# Patient Record
Sex: MALE | Race: WHITE | URBAN - METROPOLITAN AREA
[De-identification: names, ages, dates, MRNs, and addresses within clinical notes are randomized per-mention and may not be internally consistent; named-entity substitution may affect disease eponyms.]

---
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
## 2021-04-21 NOTE — TELEPHONE ENCOUNTER
FUTURE VISIT INFORMATION      FUTURE VISIT INFORMATION:    Date: 5/24/2021    Time: 10AM    Location: [facility]  REFERRAL INFORMATION:    Referring provider:  Nissen, Rick Lee, MD      Referring providers clinic:  [facility]      Reason for visit/diagnosis  Perforated ear drum- Referred by Dr. Nissen [facility]    RECORDS REQUESTED FROM:       Clinic name Comments Records Status Imaging Status   [facility]   4/7/2021 note from Nissen, Rick Lee, MD   Care everywhere    [facility]   03/17/2021 note from  Elizabeth Loving NP   Care everywhere    [facility]   3/15/2021 note from Ravin Bourne MD  Care everywhere    [facility]   [facility] MRN: [id number]   12/2/2018 CT Head  Sent to scan 4/21/21 req 4/21/21 - PACS                 4/21/2021 9:06AM sent a fax to [facility] for images - Amay   *images received in PACS - Amay

## 2021-05-10 DIAGNOSIS — Z01.10 ENCOUNTER FOR HEARING TEST: Primary | ICD-10-CM

## 2021-05-10 NOTE — PATIENT INSTRUCTIONS
1. You were seen in the ENT Clinic today by Dr. Miller.  If you have any questions or concerns after your appointment, please call   - Option 1: ENT Clinic: [phone]   - Option 2: Melissa (Dr. Miller's Nurse): [phone]    2.   Plan to return to clinic Surgery Teaching      1.You must have a physical exam (called  history and physical ) within 30 days of surgery. You can do this at the PAC clinic or your family clinic.     2.For same-day surgery, you must arrange for an adult to take you home from the Center. An adult must stay with you for the first 24 hours after surgery. You cannot drive for 24 hours.     3. Ask your doctor what medicines are safe before surgery.     4. Stop drinking alcohol at least 24 hours before surgery.     5. Stop or at least cut down on smoking 24 hours before surgery.    6.Take a bath or shower the night before and the morning of surgery (as told by your surgeon). Use an antiseptic soap. If your doctor does not give you special soap, buy Hibiclens or Shelia-Stat at the drug store or ask the pharmacist to suggest a brand.  Do not put on lotion, powder, perfume, deodorant or make-up after bathing.    7. You can eat a normal meal the night before surgery. Do not eat any solid foods or drink any milk products for 8 hours before surgery.     8. You may drink clear liquids until 2 hours before surgery. Clear liquids include water, Gatorade, apple juice and liquids you can read through.    9. NO MOTRIN, IBUPROFEN, ASPIRIN, ALEVE, GARLIC SUPPLEMENTS or FISH OIL x 7 days prior to surgery ( to prevent excess bleeding and bruising at time of surgery      Melissa Mejía LPN  [facility] - Otolaryngology

## 2021-05-24 ENCOUNTER — OFFICE VISIT (OUTPATIENT)
Dept: AUDIOLOGY | Facility: CLINIC | Age: 21
End: 2021-05-24
Payer: COMMERCIAL

## 2021-05-24 ENCOUNTER — PRE VISIT (OUTPATIENT)
Dept: OTOLARYNGOLOGY | Facility: CLINIC | Age: 21
End: 2021-05-24

## 2021-05-24 ENCOUNTER — OFFICE VISIT (OUTPATIENT)
Dept: OTOLARYNGOLOGY | Facility: CLINIC | Age: 21
End: 2021-05-24
Payer: COMMERCIAL

## 2021-05-24 VITALS
TEMPERATURE: 98.1 F | BODY MASS INDEX: 19.38 KG/M2 | DIASTOLIC BLOOD PRESSURE: 74 MMHG | WEIGHT: 127.87 LBS | HEART RATE: 59 BPM | RESPIRATION RATE: 14 BRPM | SYSTOLIC BLOOD PRESSURE: 118 MMHG | HEIGHT: 68 IN | OXYGEN SATURATION: 100 %

## 2021-05-24 DIAGNOSIS — H90.A12 CONDUCTIVE HEARING LOSS OF LEFT EAR WITH RESTRICTED HEARING OF RIGHT EAR: Primary | ICD-10-CM

## 2021-05-24 DIAGNOSIS — Z01.10 ENCOUNTER FOR HEARING TEST: ICD-10-CM

## 2021-05-24 DIAGNOSIS — H90.A21 SENSORINEURAL HEARING LOSS (SNHL) OF RIGHT EAR WITH RESTRICTED HEARING OF LEFT EAR: ICD-10-CM

## 2021-05-24 DIAGNOSIS — H72.92 PERFORATION OF LEFT TYMPANIC MEMBRANE: Primary | ICD-10-CM

## 2021-05-24 PROCEDURE — 99204 OFFICE O/P NEW MOD 45 MIN: CPT | Mod: 25 | Performed by: OTOLARYNGOLOGY

## 2021-05-24 PROCEDURE — 92550 TYMPANOMETRY & REFLEX THRESH: CPT | Mod: 52 | Performed by: AUDIOLOGIST-HEARING AID FITTER

## 2021-05-24 PROCEDURE — 92557 COMPREHENSIVE HEARING TEST: CPT | Performed by: AUDIOLOGIST-HEARING AID FITTER

## 2021-05-24 PROCEDURE — 92565 STENGER TEST PURE TONE: CPT | Performed by: AUDIOLOGIST-HEARING AID FITTER

## 2021-05-24 PROCEDURE — 92504 EAR MICROSCOPY EXAMINATION: CPT | Mod: GC | Performed by: OTOLARYNGOLOGY

## 2021-05-24 SDOH — HEALTH STABILITY: MENTAL HEALTH: HOW OFTEN DO YOU HAVE A DRINK CONTAINING ALCOHOL?: NOT ASKED

## 2021-05-24 SDOH — HEALTH STABILITY: MENTAL HEALTH: HOW OFTEN DO YOU HAVE 6 OR MORE DRINKS ON ONE OCCASION?: NOT ASKED

## 2021-05-24 SDOH — HEALTH STABILITY: MENTAL HEALTH: HOW MANY STANDARD DRINKS CONTAINING ALCOHOL DO YOU HAVE ON A TYPICAL DAY?: NOT ASKED

## 2021-05-24 ASSESSMENT — MIFFLIN-ST. JEOR: SCORE: 1564.5

## 2021-05-24 ASSESSMENT — PAIN SCALES - GENERAL: PAINLEVEL: NO PAIN (0)

## 2021-05-24 NOTE — NURSING NOTE
"Chief Complaint   Patient presents with     Consult     tympaic perforation      Blood pressure 118/74, pulse 59, temperature 98.1  F (36.7  C), resp. rate 14, height 1.727 m (5' 8\"), weight 58 kg (127 lb 13.9 oz), SpO2 100 %.    Ryan Petersen LPN    "

## 2021-05-24 NOTE — LETTER
5/24/2021       RE: Zach[place] Mcguire  [address] [address] [facility] [address]     Dear Colleague,    Thank you for referring your patient, Zachary Mcguire, to the [facility] [place] [facility]. Please see a copy of my visit note below.    Zachary Mcguire is seen in consultation from Dr. Rick Nissen.  He is a 20 year old male being seen for left tympanic membrane perforation. The patient presents for evaluation with his mother who provided supplementary historical details. The patient reports that he has experienced 7-8 episodes of acute otitis media per year over the past 3.5 years. These episodes are heralded by otalgia and aural fullness followed by otorrhea and typically occur after water exposure. These are typically treated with oral antibiotics. The patient reports a history of recurrent acute otitis media as a child requiring the placement of PE tubes. The PE tubes subsequently extruded, though he reports intermittent otorrhea from his left ear since age 5 years due to a persistent perforation. Today he denies changes in hearing, tinnitus, or vertigo. He reports barochallenge in left ear with changes in elevation.    Denies family history of hearing loss    Progress note from Dr. Rick Nissen was independently reviewed. From review of this note I learned that the patient had PE tubes placed at 18 months of age and has experienced intermittent drainage from the left ear since about 5 years of age. He reported that these episodes varied in frequency between 2-5 times per year. He denies otalgia.    No past medical history on file.    No past surgical history on file.    Family history of malignant hyperthermia. Denies family history of hearing loss.    Social History     Tobacco Use     Smoking status: Former Smoker     Packs/day: 0.50     Years: 2.00     Pack years: 1.00     Types: Cigarettes     Smokeless tobacco:  "Current User     Tobacco comment: chews tobacco  an average of twice per week   Substance Use Topics     Alcohol use: Not Currently     Drug use: None       Patient Supplied Answers to Review of Systems  [facility] ENT ROS 5/24/2021   Ears, Nose, Throat Ear pain   The remainder of the 10 point review of systems is otherwise negative.    Physical examination:  /74   Pulse 59   Temp 98.1  F (36.7  C)   Resp 14   Ht 1.727 m (5' 8\")   Wt 58 kg (127 lb 13.9 oz)   SpO2 100%   BMI 19.44 kg/m    Constitutional:  In no acute distress, appears stated age  Eyes:  Extraocular movements intact, no spontaneous nystagmus  Respiratory:  No increased work of breathing, wheezing or stridor  Musculoskeletal:  Good upper extremity strength  Skin:  No rashes on the head and neck  Neurologic:  House Brackman 1/6 bilaterally, ambulating normally  Psychiatric:  Alert, normal affect, answering questions appropriately  Ears:  Auricles are normal bilaterally.    Otomicroscopic Exam:  Right Ear: Ear canal lined with healthy skin. TM intact with tympanosclerosis in the posterior quadrant. Middle ear well-aerated.     Left Ear: Ear canal lined with healthy skin. Approximate 10% anteroinferior perforation, minimal mucosalization along the inferior aspect. Large plaque of tympanosclerosis of the posterior TM. Middle ear appears aerated.    Audiogram was independently reviewed and interpreted.    Audiogram: 5/24/2021     Right ear: Normal sloping to moderate SNHL.   Left ear: Mild upsloping to normal at 2kHz downsloping to mild CHL.   SRT right: 20 dB left: 30 dB   WR right: 100% at 60 dB left: 100% at 70 dB   Acoustic Reflexes: Right ipsi present. DNT all other conditions.  Tympanograms: type A (-40 daPa) right, type B, large canal volume left     Imaging: No imaging available for review.    IMPRESSION AND PLAN:  1.  Left tympanic membrane perforation  2.  Left conductive hearing loss, secondary to #1  3.  Right sensorineural hearing loss   "   We were able to show the patient the findings on the monitor today and discussed potential management options. In considering the benefit he may receive from left cartilage backed tympanoplasty, we must elucidate whether there is a component of eustachian tube dysfunction contributing to his episodes of otorrhea. The patient reports that his episodes of left otorrhea typically occur following water exposure, being most frequent in the spring and summer months. Further, there is no evidence of eustachian tube dysfunction in his right ear on today's otologic exam. This would support minimal to no eustachian tube dysfunction, and thus repair of his tympanic membrane perforation may be prudent to facilitate water activities as well as to eliminate future episodes of otorrhea. We engaged in a discussion regarding the possibility of the presence of eustachian tube dysfunction, and the fact that repair of his tympanic membrane perforation may thus necessitate placement of a PE tube in the future. The patient and his mother expressed understanding of this fact and are motivated to have a treatment performed on the left ear.     The procedure recommended is a left cartilage backed tympanoplasty. We will likely be able to complete this procedure transcanal. The risks and benefits were discussed.  The risks include but are not limited to:  Worsened hearing which may require further surgery, profound and irreversible hearing loss, dizziness, damage to the taste nerve, damage to the facial nerve, tympanic membrane perforation requiring further surgery and infection.  Postoperative restrictions were discussed. He has a family history of malignant hyperthermia and we thus discussed the possibility of a procedure under monitored anesthesia care. The patient and his mother will consider this option further.     With deliberation, Zachary Mcguire indicated that he would like to proceed. We will ask him to see our preoperative  anesthesia center for his preoperative assessment to discuss his risk of malignant hyperthermia.     Thank you very much for the opportunity to participate in the care of your patient.    Naresh Mancilla MD  Neurotology Fellow  Department of Otolaryngology - Head and Neck Surgery  [facility]    I, Ivanna Miller MD, saw this patient with the resident/fellow and agree with the resident/fellow s findings and plan of care as documented in the resident s/fellow s note. I was present for the entire procedure.    Ivanna Miller MD          Again, thank you for allowing me to participate in the care of your patient.      Sincerely,    Ivanna Miller MD

## 2021-05-24 NOTE — PROGRESS NOTES
Zachary Mcguire is seen in consultation from Dr. Rick Nissen.  He is a 20 year old male being seen for left tympanic membrane perforation. The patient presents for evaluation with his mother who provided supplementary historical details. The patient reports that he has experienced 7-8 episodes of acute otitis media per year over the past 3.5 years. These episodes are heralded by otalgia and aural fullness followed by otorrhea and typically occur after water exposure. These are typically treated with oral antibiotics. The patient reports a history of recurrent acute otitis media as a child requiring the placement of PE tubes. The PE tubes subsequently extruded, though he reports intermittent otorrhea from his left ear since age 5 years due to a persistent perforation. Today he denies changes in hearing, tinnitus, or vertigo. He reports barochallenge in left ear with changes in elevation.    Denies family history of hearing loss    Progress note from Dr. Rick Nissen was independently reviewed. From review of this note I learned that the patient had PE tubes placed at 18 months of age and has experienced intermittent drainage from the left ear since about 5 years of age. He reported that these episodes varied in frequency between 2-5 times per year. He denies otalgia.    No past medical history on file.    No past surgical history on file.    Family history of malignant hyperthermia. Denies family history of hearing loss.    Social History     Tobacco Use     Smoking status: Former Smoker     Packs/day: 0.50     Years: 2.00     Pack years: 1.00     Types: Cigarettes     Smokeless tobacco: Current User     Tobacco comment: chews tobacco  an average of twice per week   Substance Use Topics     Alcohol use: Not Currently     Drug use: None       Patient Supplied Answers to Review of Systems   ENT ROS 5/24/2021   Ears, Nose, Throat Ear pain   The remainder of the 10 point review of systems is otherwise  "negative.    Physical examination:  /74   Pulse 59   Temp 98.1  F (36.7  C)   Resp 14   Ht 1.727 m (5' 8\")   Wt 58 kg (127 lb 13.9 oz)   SpO2 100%   BMI 19.44 kg/m    Constitutional:  In no acute distress, appears stated age  Eyes:  Extraocular movements intact, no spontaneous nystagmus  Respiratory:  No increased work of breathing, wheezing or stridor  Musculoskeletal:  Good upper extremity strength  Skin:  No rashes on the head and neck  Neurologic:  House Brackman 1/6 bilaterally, ambulating normally  Psychiatric:  Alert, normal affect, answering questions appropriately  Ears:  Auricles are normal bilaterally.    Otomicroscopic Exam:  Right Ear: Ear canal lined with healthy skin. TM intact with tympanosclerosis in the posterior quadrant. Middle ear well-aerated.     Left Ear: Ear canal lined with healthy skin. Approximate 10% anteroinferior perforation, minimal mucosalization along the inferior aspect. Large plaque of tympanosclerosis of the posterior TM. Middle ear appears aerated.    Audiogram was independently reviewed and interpreted.    Audiogram: 5/24/2021     Right ear: Normal sloping to moderate SNHL.   Left ear: Mild upsloping to normal at 2kHz downsloping to mild CHL.   SRT right: 20 dB left: 30 dB   WR right: 100% at 60 dB left: 100% at 70 dB   Acoustic Reflexes: Right ipsi present. DNT all other conditions.  Tympanograms: type A (-40 daPa) right, type B, large canal volume left     Imaging: No imaging available for review.    IMPRESSION AND PLAN:  1.  Left tympanic membrane perforation  2.  Left conductive hearing loss, secondary to #1  3.  Right sensorineural hearing loss     We were able to show the patient the findings on the monitor today and discussed potential management options. In considering the benefit he may receive from left cartilage backed tympanoplasty, we must elucidate whether there is a component of eustachian tube dysfunction contributing to his episodes of otorrhea. " The patient reports that his episodes of left otorrhea typically occur following water exposure, being most frequent in the spring and summer months. Further, there is no evidence of eustachian tube dysfunction in his right ear on today's otologic exam. This would support minimal to no eustachian tube dysfunction, and thus repair of his tympanic membrane perforation may be prudent to facilitate water activities as well as to eliminate future episodes of otorrhea. We engaged in a discussion regarding the possibility of the presence of eustachian tube dysfunction, and the fact that repair of his tympanic membrane perforation may thus necessitate placement of a PE tube in the future. The patient and his mother expressed understanding of this fact and are motivated to have a treatment performed on the left ear.     The procedure recommended is a left cartilage backed tympanoplasty. We will likely be able to complete this procedure transcanal. The risks and benefits were discussed.  The risks include but are not limited to:  Worsened hearing which may require further surgery, profound and irreversible hearing loss, dizziness, damage to the taste nerve, damage to the facial nerve, tympanic membrane perforation requiring further surgery and infection.  Postoperative restrictions were discussed. He has a family history of malignant hyperthermia and we thus discussed the possibility of a procedure under monitored anesthesia care. The patient and his mother will consider this option further.     With deliberation, Zachary Mcguire indicated that he would like to proceed. We will ask him to see our preoperative anesthesia center for his preoperative assessment to discuss his risk of malignant hyperthermia.     Thank you very much for the opportunity to participate in the care of your patient.    Naresh Mancilla MD  Neurotology Fellow  Department of Otolaryngology - Head and Neck Surgery  [facility]    Ivanna GONZALEZ  BRIAN Miller MD, saw this patient with the resident/fellow and agree with the resident/fellow s findings and plan of care as documented in the resident s/fellow s note. I was present for the entire procedure.    Ivanna Miller MD

## 2021-05-24 NOTE — PROGRESS NOTES
AUDIOLOGY REPORT    SUMMARY: Audiology visit completed. See audiogram for results.      RECOMMENDATIONS: Follow-up with ENT.    JAYDEN Perez.   Audiology Doctoral Extern  License #[id number]    I was present with the patient for the entire Audiology appointment including all procedures/testing performed by the AuD student, and agree with the student s assessment and plan as documented.    Jaciel William, CCC-A, [facility]  Licensed Audiologist  [place] #[id number]

## 2021-06-10 RX ORDER — CLINDAMYCIN PHOSPHATE 900 MG/50ML
900 INJECTION, SOLUTION INTRAVENOUS
Status: CANCELLED | OUTPATIENT
Start: 2021-06-10

## 2021-06-10 RX ORDER — CLINDAMYCIN PHOSPHATE 900 MG/50ML
900 INJECTION, SOLUTION INTRAVENOUS SEE ADMIN INSTRUCTIONS
Status: CANCELLED | OUTPATIENT
Start: 2021-06-10

## 2021-06-10 RX ORDER — DEXAMETHASONE SODIUM PHOSPHATE 4 MG/ML
10 INJECTION, SOLUTION INTRA-ARTICULAR; INTRALESIONAL; INTRAMUSCULAR; INTRAVENOUS; SOFT TISSUE ONCE
Status: CANCELLED | OUTPATIENT
Start: 2021-06-10 | End: 2021-06-10

## 2021-06-15 ENCOUNTER — TELEPHONE (OUTPATIENT)
Dept: OTOLARYNGOLOGY | Facility: CLINIC | Age: 21
End: 2021-06-15

## 2021-06-15 NOTE — TELEPHONE ENCOUNTER
Attempted to contact patient to schedule surgery. Patients voicemail box is full and writer is unable to leave a message.       Julia Hazel on 6/15/2021 at 3:03 PM

## 2021-06-15 NOTE — TELEPHONE ENCOUNTER
Left message regarding scheduling surgery with Dr. Miller.   Writer left call back number on the patients voicemail       Julia Hazel on 6/15/2021 at 3:22 PM   P: [phone]

## 2021-06-18 DIAGNOSIS — Z11.59 ENCOUNTER FOR SCREENING FOR OTHER VIRAL DISEASES: ICD-10-CM

## 2021-06-18 PROBLEM — H72.92 PERFORATION OF LEFT TYMPANIC MEMBRANE: Status: ACTIVE | Noted: 2021-06-18

## 2021-06-21 NOTE — TELEPHONE ENCOUNTER
Patient called back to schedule surgery with Dr. Miller    Date of Surgery: 07/12/2021  Location of surgery: [facility]  Pre-Op H&P: PCP   Post-Op Appt Date: 3 weeks   Imaging needed:  No  Discussed COVID-19 testing:  Yes  Pre-cert/Authorization completed:  No  Packet sent out: Yes 06/21/21

## 2021-07-07 ENCOUNTER — TELEPHONE (OUTPATIENT)
Dept: OTOLARYNGOLOGY | Facility: CLINIC | Age: 21
End: 2021-07-07

## 2021-07-07 ENCOUNTER — ANESTHESIA EVENT (OUTPATIENT)
Dept: SURGERY | Facility: AMBULATORY SURGERY CENTER | Age: 21
End: 2021-07-07
Payer: COMMERCIAL

## 2021-07-07 DIAGNOSIS — H72.92 PERFORATION OF LEFT TYMPANIC MEMBRANE: Primary | ICD-10-CM

## 2021-07-07 NOTE — TELEPHONE ENCOUNTER
Tried calling this patient to schedule a [facility] Return appt with Dr. Miller. OK to schedule with another return patient. (Ideally- 2:45pm on 8/23)    Appt Note: 6-7 week post op, surgery 7/12, WIN prior      They will also need a (WIN) hearing test with any of the Audiologists, completed prior to this appt. (Ideally- 2:00pm on 8/23)    Appt Note: WIN

## 2021-07-12 ENCOUNTER — HOSPITAL ENCOUNTER (OUTPATIENT)
Facility: AMBULATORY SURGERY CENTER | Age: 21
End: 2021-07-12
Attending: OTOLARYNGOLOGY
Payer: COMMERCIAL

## 2021-07-12 ENCOUNTER — ANESTHESIA (OUTPATIENT)
Dept: SURGERY | Facility: AMBULATORY SURGERY CENTER | Age: 21
End: 2021-07-12
Payer: COMMERCIAL

## 2021-07-12 VITALS
DIASTOLIC BLOOD PRESSURE: 64 MMHG | RESPIRATION RATE: 16 BRPM | OXYGEN SATURATION: 98 % | WEIGHT: 125 LBS | TEMPERATURE: 97.8 F | BODY MASS INDEX: 18.94 KG/M2 | HEART RATE: 75 BPM | SYSTOLIC BLOOD PRESSURE: 106 MMHG | HEIGHT: 68 IN

## 2021-07-12 DIAGNOSIS — H72.92 PERFORATION OF LEFT TYMPANIC MEMBRANE: ICD-10-CM

## 2021-07-12 DIAGNOSIS — G89.18 POSTOPERATIVE PAIN: Primary | ICD-10-CM

## 2021-07-12 PROCEDURE — 69620 MYRINGOPLASTY: CPT | Mod: LT

## 2021-07-12 RX ORDER — DEXAMETHASONE SODIUM PHOSPHATE 4 MG/ML
INJECTION, SOLUTION INTRA-ARTICULAR; INTRALESIONAL; INTRAMUSCULAR; INTRAVENOUS; SOFT TISSUE PRN
Status: DISCONTINUED | OUTPATIENT
Start: 2021-07-12 | End: 2021-07-12

## 2021-07-12 RX ORDER — LIDOCAINE HYDROCHLORIDE 10 MG/ML
INJECTION, SOLUTION INFILTRATION; PERINEURAL PRN
Status: DISCONTINUED | OUTPATIENT
Start: 2021-07-12 | End: 2021-07-12

## 2021-07-12 RX ORDER — ONDANSETRON 2 MG/ML
4 INJECTION INTRAMUSCULAR; INTRAVENOUS EVERY 30 MIN PRN
Status: DISCONTINUED | OUTPATIENT
Start: 2021-07-12 | End: 2021-07-13 | Stop reason: HOSPADM

## 2021-07-12 RX ORDER — HYDROMORPHONE HYDROCHLORIDE 1 MG/ML
.3-.5 INJECTION, SOLUTION INTRAMUSCULAR; INTRAVENOUS; SUBCUTANEOUS EVERY 10 MIN PRN
Status: DISCONTINUED | OUTPATIENT
Start: 2021-07-12 | End: 2021-07-13 | Stop reason: HOSPADM

## 2021-07-12 RX ORDER — OFLOXACIN 3 MG/ML
5 SOLUTION AURICULAR (OTIC) 2 TIMES DAILY
Qty: 10 ML | Refills: 0 | Status: SHIPPED | OUTPATIENT
Start: 2021-07-19 | End: 2021-08-09

## 2021-07-12 RX ORDER — LIDOCAINE 40 MG/G
CREAM TOPICAL
Status: DISCONTINUED | OUTPATIENT
Start: 2021-07-12 | End: 2021-07-12 | Stop reason: HOSPADM

## 2021-07-12 RX ORDER — LORAZEPAM 2 MG/ML
.5-1 INJECTION INTRAMUSCULAR
Status: DISCONTINUED | OUTPATIENT
Start: 2021-07-12 | End: 2021-07-12 | Stop reason: HOSPADM

## 2021-07-12 RX ORDER — CLINDAMYCIN PHOSPHATE 900 MG/50ML
900 INJECTION, SOLUTION INTRAVENOUS
Status: COMPLETED | OUTPATIENT
Start: 2021-07-12 | End: 2021-07-12

## 2021-07-12 RX ORDER — ONDANSETRON 4 MG/1
4 TABLET, ORALLY DISINTEGRATING ORAL EVERY 30 MIN PRN
Status: DISCONTINUED | OUTPATIENT
Start: 2021-07-12 | End: 2021-07-13 | Stop reason: HOSPADM

## 2021-07-12 RX ORDER — NALOXONE HYDROCHLORIDE 0.4 MG/ML
0.2 INJECTION, SOLUTION INTRAMUSCULAR; INTRAVENOUS; SUBCUTANEOUS
Status: DISCONTINUED | OUTPATIENT
Start: 2021-07-12 | End: 2021-07-13 | Stop reason: HOSPADM

## 2021-07-12 RX ORDER — CLINDAMYCIN PHOSPHATE 900 MG/50ML
900 INJECTION, SOLUTION INTRAVENOUS SEE ADMIN INSTRUCTIONS
Status: DISCONTINUED | OUTPATIENT
Start: 2021-07-12 | End: 2021-07-12 | Stop reason: HOSPADM

## 2021-07-12 RX ORDER — SODIUM CHLORIDE, SODIUM LACTATE, POTASSIUM CHLORIDE, CALCIUM CHLORIDE 600; 310; 30; 20 MG/100ML; MG/100ML; MG/100ML; MG/100ML
INJECTION, SOLUTION INTRAVENOUS CONTINUOUS
Status: DISCONTINUED | OUTPATIENT
Start: 2021-07-12 | End: 2021-07-12 | Stop reason: HOSPADM

## 2021-07-12 RX ORDER — MEPERIDINE HYDROCHLORIDE 25 MG/ML
12.5 INJECTION INTRAMUSCULAR; INTRAVENOUS; SUBCUTANEOUS
Status: DISCONTINUED | OUTPATIENT
Start: 2021-07-12 | End: 2021-07-13 | Stop reason: HOSPADM

## 2021-07-12 RX ORDER — GLYCOPYRROLATE 0.2 MG/ML
INJECTION, SOLUTION INTRAMUSCULAR; INTRAVENOUS PRN
Status: DISCONTINUED | OUTPATIENT
Start: 2021-07-12 | End: 2021-07-12

## 2021-07-12 RX ORDER — KETOROLAC TROMETHAMINE 30 MG/ML
30 INJECTION, SOLUTION INTRAMUSCULAR; INTRAVENOUS EVERY 6 HOURS PRN
Status: DISCONTINUED | OUTPATIENT
Start: 2021-07-12 | End: 2021-07-13 | Stop reason: HOSPADM

## 2021-07-12 RX ORDER — NALOXONE HYDROCHLORIDE 0.4 MG/ML
0.4 INJECTION, SOLUTION INTRAMUSCULAR; INTRAVENOUS; SUBCUTANEOUS
Status: DISCONTINUED | OUTPATIENT
Start: 2021-07-12 | End: 2021-07-13 | Stop reason: HOSPADM

## 2021-07-12 RX ORDER — ONDANSETRON 2 MG/ML
INJECTION INTRAMUSCULAR; INTRAVENOUS PRN
Status: DISCONTINUED | OUTPATIENT
Start: 2021-07-12 | End: 2021-07-12

## 2021-07-12 RX ORDER — HYDROCODONE BITARTRATE AND ACETAMINOPHEN 5; 325 MG/1; MG/1
1 TABLET ORAL
Status: DISCONTINUED | OUTPATIENT
Start: 2021-07-12 | End: 2021-07-13 | Stop reason: HOSPADM

## 2021-07-12 RX ORDER — ACETAMINOPHEN 325 MG/1
975 TABLET ORAL ONCE
Status: COMPLETED | OUTPATIENT
Start: 2021-07-12 | End: 2021-07-12

## 2021-07-12 RX ORDER — LIDOCAINE HYDROCHLORIDE AND EPINEPHRINE 10; 10 MG/ML; UG/ML
INJECTION, SOLUTION INFILTRATION; PERINEURAL PRN
Status: DISCONTINUED | OUTPATIENT
Start: 2021-07-12 | End: 2021-07-12 | Stop reason: HOSPADM

## 2021-07-12 RX ORDER — DEXAMETHASONE SODIUM PHOSPHATE 10 MG/ML
10 INJECTION, SOLUTION INTRAMUSCULAR; INTRAVENOUS ONCE
Status: DISCONTINUED | OUTPATIENT
Start: 2021-07-12 | End: 2021-07-12 | Stop reason: HOSPADM

## 2021-07-12 RX ORDER — ACETAMINOPHEN 325 MG/1
650 TABLET ORAL
Status: DISCONTINUED | OUTPATIENT
Start: 2021-07-12 | End: 2021-07-13 | Stop reason: HOSPADM

## 2021-07-12 RX ORDER — SODIUM CHLORIDE, SODIUM LACTATE, POTASSIUM CHLORIDE, CALCIUM CHLORIDE 600; 310; 30; 20 MG/100ML; MG/100ML; MG/100ML; MG/100ML
INJECTION, SOLUTION INTRAVENOUS CONTINUOUS
Status: DISCONTINUED | OUTPATIENT
Start: 2021-07-12 | End: 2021-07-13 | Stop reason: HOSPADM

## 2021-07-12 RX ORDER — ALBUTEROL SULFATE 0.83 MG/ML
2.5 SOLUTION RESPIRATORY (INHALATION) EVERY 4 HOURS PRN
Status: DISCONTINUED | OUTPATIENT
Start: 2021-07-12 | End: 2021-07-12 | Stop reason: HOSPADM

## 2021-07-12 RX ORDER — FENTANYL CITRATE 50 UG/ML
25-50 INJECTION, SOLUTION INTRAMUSCULAR; INTRAVENOUS
Status: DISCONTINUED | OUTPATIENT
Start: 2021-07-12 | End: 2021-07-13 | Stop reason: HOSPADM

## 2021-07-12 RX ORDER — OXYCODONE HYDROCHLORIDE 5 MG/1
5 TABLET ORAL EVERY 4 HOURS PRN
Status: DISCONTINUED | OUTPATIENT
Start: 2021-07-12 | End: 2021-07-13 | Stop reason: HOSPADM

## 2021-07-12 RX ORDER — HYDROCODONE BITARTRATE AND ACETAMINOPHEN 5; 325 MG/1; MG/1
1 TABLET ORAL EVERY 6 HOURS PRN
Qty: 4 TABLET | Refills: 0 | Status: SHIPPED | OUTPATIENT
Start: 2021-07-12 | End: 2021-08-23

## 2021-07-12 RX ORDER — PROPOFOL 10 MG/ML
INJECTION, EMULSION INTRAVENOUS PRN
Status: DISCONTINUED | OUTPATIENT
Start: 2021-07-12 | End: 2021-07-12

## 2021-07-12 RX ORDER — DIMENHYDRINATE 50 MG/ML
25 INJECTION, SOLUTION INTRAMUSCULAR; INTRAVENOUS
Status: DISCONTINUED | OUTPATIENT
Start: 2021-07-12 | End: 2021-07-13 | Stop reason: HOSPADM

## 2021-07-12 RX ORDER — FENTANYL CITRATE 50 UG/ML
25-50 INJECTION, SOLUTION INTRAMUSCULAR; INTRAVENOUS
Status: DISCONTINUED | OUTPATIENT
Start: 2021-07-12 | End: 2021-07-12 | Stop reason: HOSPADM

## 2021-07-12 RX ORDER — KETAMINE HYDROCHLORIDE 10 MG/ML
INJECTION INTRAMUSCULAR; INTRAVENOUS PRN
Status: DISCONTINUED | OUTPATIENT
Start: 2021-07-12 | End: 2021-07-12

## 2021-07-12 RX ORDER — PROPOFOL 10 MG/ML
INJECTION, EMULSION INTRAVENOUS CONTINUOUS PRN
Status: DISCONTINUED | OUTPATIENT
Start: 2021-07-12 | End: 2021-07-12

## 2021-07-12 RX ORDER — GABAPENTIN 300 MG/1
300 CAPSULE ORAL ONCE
Status: COMPLETED | OUTPATIENT
Start: 2021-07-12 | End: 2021-07-12

## 2021-07-12 RX ADMIN — SODIUM CHLORIDE, SODIUM LACTATE, POTASSIUM CHLORIDE, CALCIUM CHLORIDE: 600; 310; 30; 20 INJECTION, SOLUTION INTRAVENOUS at 08:58

## 2021-07-12 RX ADMIN — PROPOFOL 125 MCG/KG/MIN: 10 INJECTION, EMULSION INTRAVENOUS at 09:30

## 2021-07-12 RX ADMIN — PROPOFOL 30 MG: 10 INJECTION, EMULSION INTRAVENOUS at 09:27

## 2021-07-12 RX ADMIN — CLINDAMYCIN PHOSPHATE 900 MG: 900 INJECTION, SOLUTION INTRAVENOUS at 09:00

## 2021-07-12 RX ADMIN — ACETAMINOPHEN 975 MG: 325 TABLET ORAL at 08:56

## 2021-07-12 RX ADMIN — LIDOCAINE HYDROCHLORIDE 60 MG: 10 INJECTION, SOLUTION INFILTRATION; PERINEURAL at 09:26

## 2021-07-12 RX ADMIN — PROPOFOL 20 MG: 10 INJECTION, EMULSION INTRAVENOUS at 09:30

## 2021-07-12 RX ADMIN — KETAMINE HYDROCHLORIDE 20 MG: 10 INJECTION INTRAMUSCULAR; INTRAVENOUS at 09:30

## 2021-07-12 RX ADMIN — ONDANSETRON 4 MG: 2 INJECTION INTRAMUSCULAR; INTRAVENOUS at 09:26

## 2021-07-12 RX ADMIN — GLYCOPYRROLATE 100 MCG: 0.2 INJECTION, SOLUTION INTRAMUSCULAR; INTRAVENOUS at 09:25

## 2021-07-12 RX ADMIN — DEXAMETHASONE SODIUM PHOSPHATE 10 MG: 4 INJECTION, SOLUTION INTRA-ARTICULAR; INTRALESIONAL; INTRAMUSCULAR; INTRAVENOUS; SOFT TISSUE at 09:30

## 2021-07-12 RX ADMIN — GABAPENTIN 300 MG: 300 CAPSULE ORAL at 08:55

## 2021-07-12 ASSESSMENT — LIFESTYLE VARIABLES: TOBACCO_USE: 1

## 2021-07-12 ASSESSMENT — MIFFLIN-ST. JEOR: SCORE: 1546.5

## 2021-07-12 NOTE — ANESTHESIA CARE TRANSFER NOTE
Patient: Zachary Mcguire    Procedure(s):  TYMPANOPLASTY WITH CARTILAGE BACKING    Diagnosis: Perforation of left tympanic membrane [H72.92]  Diagnosis Additional Information: No value filed.    Anesthesia Type:   No value filed.     Note:    Oropharynx: oropharynx clear of all foreign objects and spontaneously breathing  Level of Consciousness: drowsy  Oxygen Supplementation: room air    Independent Airway: airway patency satisfactory and stable  Dentition: dentition unchanged      Patient transferred to: Phase II    Handoff Report: Identifed the Patient, Identified the Reponsible Provider, Reviewed the pertinent medical history, Discussed the surgical course, Reviewed Intra-OP anesthesia mangement and issues during anesthesia, Set expectations for post-procedure period and Allowed opportunity for questions and acknowledgement of understanding      Vitals: (Last set prior to Anesthesia Care Transfer)  CRNA VITALS  7/12/2021 0937 - 7/12/2021 1023      7/12/2021             Pulse:  90    SpO2:  97 %    Resp Rate (set):  10        Electronically Signed By: MARCELINA De La Cruz CRNA  July 12, 2021  10:23 AM

## 2021-07-12 NOTE — BRIEF OP NOTE
[facility]    Brief Operative Note    Pre-operative diagnosis: Perforation of left tympanic membrane [H72.92]  Post-operative diagnosis Same as pre-operative diagnosis    Procedure: Procedure(s):  TYMPANOPLASTY WITH CARTILAGE BACKING  Surgeon: Surgeon(s) and Role:     * Ivanna Miller MD - Primary     * Zaire Tellez MD - Resident - Assisting  Anesthesia: Combined MAC with Local   Estimated blood loss: None  Drains: None  Specimens: * No specimens in log *  Findings: left microperforation in the anteroinferior quadrant, filled with tragal cartilage graft   Complications: None   .  Implants: * No implants in log *

## 2021-07-12 NOTE — OP NOTE
Date of service:  7/12/21    Preoperative diagnosis:  Tympanic membrane perforation    Postoperative diagnosis:  Tympanic membrane perforation    Procedure:  1.  Left myringoplasty with cartilage backing  2.  Facial nerve monitoring x 0.5 hours    Surgeon:  Ivanna Miller MD    Fellow:  Marian Palafox MD    Resident:  Zaire Tellez MD    Anesthesia:  MAC plus local    EBL:  2cc    Specimens:  None    Complications:  None    Findings: 15% anterior inferior perforation after rimming, middle ear appeared clear through the perforation, no retractions.    Indications:  Zachary Mcguire is a patient with a left tympanic membrane perforation.  Discussion was had about tympanoplasty.  Risks and benefits had been discussed and consent had been obtained.    Procedure:  The patient was taken to the operating room and placed supine on the operating room table.  The patient was placed under MAC.  Time Out was then performed with confirmation of the patient, site and procedure. 1% lidocaine with 1:100,000 epinephrine was injected into the ear canal and tragus.  The area was prepped and draped in standard surgical fashion. Tragal incision was made and an approximately 2mm x 2mm piece of cartilage with perichondrium was harvested. The incision was closed with chromic suture. The operating microscope was brought into position and used for the remainder of the case.  The ear canal was inspected and irrigated with normal saline.  The tympanic membrane was inspected.  There is noted to be a 10% anterior inferior round perforation with clean edges and slight amount of mucosalization on the superior edge of the perforation. The perforation was rimmed. The middle ear was clear through the perforation. There were no retractions noted. Gelfoam was placed into the middle ear through the perforation. The cartilage graft was placed with the perichondrium up through the perforation to close the perforation. Gelfoam used to cover the graft.   Bacitracin was used to fill the ear canal.  A cotton ball was placed over the ear canal and the facial nerve electrodes removed.  The patient tolerated the procedure well and counts were correct.  The patient was awakened and taken to the PACU in stable condition.    Ivanna GONZALEZ MD, was scrubbed during all portions of the procedure.

## 2021-07-12 NOTE — ANESTHESIA POSTPROCEDURE EVALUATION
Patient: Zachary Mcguire    Procedure(s):  TYMPANOPLASTY WITH CARTILAGE BACKING    Diagnosis:Perforation of left tympanic membrane [H72.92]  Diagnosis Additional Information: No value filed.    Anesthesia Type:  MAC    Note:  Disposition: Outpatient   Postop Pain Control: Uneventful            Sign Out: Well controlled pain   PONV: No   Neuro/Psych: Uneventful            Sign Out: Acceptable/Baseline neuro status   Airway/Respiratory: Uneventful            Sign Out: Acceptable/Baseline resp. status   CV/Hemodynamics: Uneventful            Sign Out: Acceptable CV status; No obvious hypovolemia; No obvious fluid overload   Other NRE: NONE   DID A NON-ROUTINE EVENT OCCUR? No           Last vitals:  Vitals:    07/12/21 1040 07/12/21 1055 07/12/21 1108   BP: 97/52 103/59 106/64   Pulse:      Resp: 15 15 16   Temp: 36.5  C (97.7  F) 36.4  C (97.6  F) 36.6  C (97.8  F)   SpO2: 97% 97% 98%       Last vitals prior to Anesthesia Care Transfer:  CRNA VITALS  7/12/2021 0937 - 7/12/2021 1037      7/12/2021             Pulse:  90    SpO2:  97 %    Resp Rate (set):  10          Electronically Signed By: Fer Cat MD  July 12, 2021  11:29 AM

## 2021-07-12 NOTE — DISCHARGE INSTRUCTIONS
"1. Resume your home medications. Take pain medications as indicated. Use docusate to avoid constipation. Start your ear drops in 1 week after surgery and use until your follow up.    2. Wound care  * Change the cotton ball in your ear as needed for drainage.  It's normal to expect some drainage from your ear for the first few days after surgery.    3. Use a cotton ball coated with vasoline to keep water out of ear canal.    4. For the next week, no heavy lifting more than 5 pounds, no strenuous activities. No nose blowing. Sneeze with your mouth open.    5. Please call MD or come to the ED for shortness of breath, trouble breathing, inability to tolerate liquids, intractable dizziness, or signs of infection such as fevers or redness.      Call the [phone] clinic number if you have any questions and concerns during the day and call [phone] at night and ask for \"ENT resident on call\".     6. Follow up with Dr. Miller as previously scheduled.  [facility]  Home Care Following Anesthesia  For 24 hours after surgery:  1. Get plenty of rest.  A responsible adult must stay with you for at least 24 hours after you leave the surgery center.  2. Do not drive or use heavy equipment.  If you have weakness or tingling, don't drive or use heavy equipment until this feeling goes away.   3. Do not drink alcohol.   4. Avoid strenuous or risky activities.  Ask for help when climbing stairs.  5. You may feel lightheaded.  IF so, sit for a few minutes before standing.  Have someone help you get up.   6. If you have nausea (feel sick to your stomach): Drink only clear liquids such as apple juice, ginger ale, broth or 7-Up.  Rest may also help.  Be sure to drink enough fluids.  Move to a regular diet as you feel able.   7. You may have a slight fever.  Call the doctor if your fever is over 100 F (37.7 C) (taken under the tongue) or lasts longer than 24 hours.  8. You may have a dry mouth, a " sore throat, muscle aches or trouble sleeping. These should go away after 24 hours.  9. Do not make important or legal decisions.   10. It is recommended to avoid smoking.               Tips for taking pain medications  To get the best pain relief possible, remember these points:    Take pain medications as directed, before pain becomes severe.    Pain medication can upset your stomach: taking it with food may help.    Constipation is a common side effect of pain medication. Drink plenty of  fluids.    Eat foods high in fiber. Take a stool softener if recommended by your doctor or pharmacist.    Do not drink alcohol, drive or operate machinery while taking pain medications.    Ask about other ways to control pain, such as with heat, ice or relaxation.    Tylenol/Acetaminophen Consumption  To help encourage the safe use of acetaminophen, the makers of TYLENOL  have lowered the maximum daily dose for single-ingredient Extra Strength TYLENOL  (acetaminophen) products sold in the [place] from 8 pills per day (4,000 mg) to 6 pills per day (3,000 mg). The dosing interval has also changed from 2 pills every 4-6 hours to 2 pills every 6 hours.    If you feel your pain relief is insufficient, you may take Tylenol/Acetaminophen in addition to your narcotic pain medication.     Be careful not to exceed 3,000 mg of Tylenol/Acetaminophen in a 24 hour period from all sources.    If you are taking extra strength Tylenol/acetaminophen (500 mg), the maximum dose is 6 tablets in 24 hours.    If you are taking regular strength acetaminophen (325 mg), the maximum dose is 9 tablets in 24 hours.    Tylenol 975 mg given at 8:56 AM.  Next dose available at 2:56PM, then follow package directions.    Call a doctor for any of the followin. Signs of infection (fever, growing tenderness at the surgery site, a large amount of drainage or bleeding, severe pain, foul-smelling drainage, redness, swelling).  2. It has been over 8 to 10 hours since  surgery and you are still not able to urinate (pass water).  3. Headache for over 24 hours.  4. Signs of Covid-19 infection (temperature over 100 degrees, shortness of breath, cough, loss of taste/smell, generalized body aches, persistent headache, chills, sore throat, nausea/vomiting/diarrhea)  Your doctor is:  Dr. Ivanna Miller, ENT Otolaryngology: [phone]                 Or dial [phone] and ask for the resident on call for:  ENT Otolaryngology  For emergency care, call the:  [facility]:  [phone] (TTY for hearing impaired: [phone])

## 2021-07-12 NOTE — ANESTHESIA PREPROCEDURE EVALUATION
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 No pertinent surgical history.   Allergies   Allergen Reactions     Amoxicillin Nausea and Vomiting and Rash      Social History     Tobacco Use     Smoking status: Former Smoker     Packs/day: 0.50     Years: 2.00     Pack years: 1.00     Types: Cigarettes     Smokeless tobacco: Current User     Tobacco comment: chews tobacco  an average of twice per week   Substance Use Topics     Alcohol use: Not Currently      Wt Readings from Last 1 Encounters:   21 56.7 kg (125 lb)        Anesthesia Evaluation   Pt has had prior anesthetic.     History of anesthetic complications   Grandparent with MH history.    ROS/MED HX  ENT/Pulmonary:     (+) tobacco use, Past use,     Neurologic:  - neg neurologic ROS     Cardiovascular:  - neg cardiovascular ROS     METS/Exercise Tolerance:     Hematologic:  - neg hematologic  ROS     Musculoskeletal:  - neg musculoskeletal ROS     GI/Hepatic:  - neg GI/hepatic ROS  (-) GERD   Renal/Genitourinary:  - neg Renal ROS     Endo:  - neg endo ROS     Psychiatric/Substance Use:  - neg psychiatric ROS     Infectious Disease:  - neg infectious disease ROS     Malignancy:  - neg malignancy ROS     Other:  - neg other ROS          Physical Exam    Airway  airway exam normal      Mallampati: II   TM distance: > 3 FB   Neck ROM: full   Mouth opening: > 3 cm    Respiratory Devices and Support         Dental  no notable dental history         Cardiovascular             Pulmonary                   OUTSIDE LABS:  CBC: No results found for: WBC, HGB, HCT, PLT  BMP: No results found for: NA, POTASSIUM, CHLORIDE, CO2, BUN, CR,  GLC  COAGS: No results found for: PTT, INR, FIBR  POC: No results found for: BGM, HCG, HCGS  HEPATIC: No results found for: ALBUMIN, PROTTOTAL, ALT, AST, GGT, ALKPHOS, BILITOTAL, BILIDIRECT, BENY  OTHER: No results found for: PH, LACT, A1C, [place], PHOS, MAG, LIPASE, AMYLASE, TSH, T4, T3, CRP, SED    Anesthesia Plan    ASA Status:  2   NPO Status:  NPO Appropriate    Anesthesia Type: MAC.     - Reason for MAC: straight local not clinically adequate     Induction: Intravenous, Propofol.     - Malignant Hyperthermia Precautions   Maintenance: TIVA.        Consents    Anesthesia Plan(s) and associated risks, benefits, and realistic alternatives discussed. Questions answered and patient/representative(s) expressed understanding.     - Discussed with:  Patient      - Extended Intubation/Ventilatory Support Discussed: No.      - Patient is DNR/DNI Status: No    Use of blood products discussed: No .     Postoperative Care    Pain management: IV analgesics, Oral pain medications, Multi-modal analgesia.   PONV prophylaxis: Background Propofol Infusion     Comments:                Fer Cat MD

## 2021-08-23 ENCOUNTER — OFFICE VISIT (OUTPATIENT)
Dept: OTOLARYNGOLOGY | Facility: CLINIC | Age: 21
End: 2021-08-23
Payer: COMMERCIAL

## 2021-08-23 ENCOUNTER — OFFICE VISIT (OUTPATIENT)
Dept: AUDIOLOGY | Facility: CLINIC | Age: 21
End: 2021-08-23
Attending: OTOLARYNGOLOGY
Payer: COMMERCIAL

## 2021-08-23 VITALS
TEMPERATURE: 98.4 F | WEIGHT: 127.87 LBS | OXYGEN SATURATION: 99 % | BODY MASS INDEX: 19.38 KG/M2 | HEART RATE: 61 BPM | HEIGHT: 68 IN

## 2021-08-23 DIAGNOSIS — H72.92 PERFORATION OF LEFT TYMPANIC MEMBRANE: ICD-10-CM

## 2021-08-23 DIAGNOSIS — H90.A21 SENSORINEURAL HEARING LOSS (SNHL) OF RIGHT EAR WITH RESTRICTED HEARING OF LEFT EAR: Primary | ICD-10-CM

## 2021-08-23 DIAGNOSIS — H72.92 PERFORATION OF LEFT TYMPANIC MEMBRANE: Primary | ICD-10-CM

## 2021-08-23 DIAGNOSIS — H90.A12 CONDUCTIVE HEARING LOSS OF LEFT EAR WITH RESTRICTED HEARING OF RIGHT EAR: ICD-10-CM

## 2021-08-23 PROCEDURE — 92557 COMPREHENSIVE HEARING TEST: CPT | Performed by: AUDIOLOGIST

## 2021-08-23 PROCEDURE — 99024 POSTOP FOLLOW-UP VISIT: CPT | Mod: GC | Performed by: OTOLARYNGOLOGY

## 2021-08-23 PROCEDURE — 92550 TYMPANOMETRY & REFLEX THRESH: CPT | Mod: 52 | Performed by: AUDIOLOGIST

## 2021-08-23 RX ORDER — PREDNISOLONE ACETATE 10 MG/ML
3-4 SUSPENSION/ DROPS OPHTHALMIC 2 TIMES DAILY
Qty: 5 ML | Refills: 1 | Status: SHIPPED | OUTPATIENT
Start: 2021-08-23 | End: 2021-09-06

## 2021-08-23 ASSESSMENT — PAIN SCALES - GENERAL: PAINLEVEL: NO PAIN (0)

## 2021-08-23 ASSESSMENT — MIFFLIN-ST. JEOR: SCORE: 1559.5

## 2021-08-23 NOTE — NURSING NOTE
"Chief Complaint   Patient presents with     RECHECK     post op      Pulse 61, temperature 98.4  F (36.9  C), height 1.727 m (5' 8\"), weight 58 kg (127 lb 13.9 oz), SpO2 99 %.    Ryan Petersen LPN    "

## 2021-08-23 NOTE — PATIENT INSTRUCTIONS
1. You were seen in the ENT Clinic today by Dr. Miller.  If you have any questions or concerns after your appointment, please call   - Option 1: ENT Clinic: [phone]   - Option 2: Melissa (Dr. Miller's Nurse): [phone]                   Sherin(Dr. Miller's Nurse): [phone]    2.   Plan to return to clinic in 4-6 weeks    3. Pred-forte in left ear x 2 weeks with nancy Mejía LPN  [facility] - Otolaryngology

## 2021-08-23 NOTE — PROGRESS NOTES
AUDIOLOGY REPORT    SUMMARY: Audiology visit completed. See audiogram for results.      RECOMMENDATIONS: Follow-up with ENT.      Roro Pichardo M.A.   Audiology Doctoral Student    I was present with the patient for the entire Audiology appointment including all procedures/testing performed by the AuD student, and agree with the student s assessment and plan as documented.    Jaciel Barriga, [facility]  Licensed Audiologist  [place] #[id number]

## 2021-08-23 NOTE — LETTER
"2021      RE: Zachary Mcguire  [address]  [facility] [address]         Otolaryngology Clinic      Name: Zachary Mcguire  MRN: [id number]  Age: 21 year old  : 2021      Chief Complaint:   Follow up    History of Present Illness:   Zachary Mcguire is a 21 year old male who presents for follow up 6 weeks s/p left tympanoplasty with cartilage backing (2021). The patient has not voiced any concerns or complaints since surgery.    Today, the patient has no concerns. Mild aural fullness but otherwise his ear feels good. Never really had pain since surgery. No otorrhea or dizziness.     Physical Exam:   Pulse 61   Temp 98.4  F (36.9  C)   Ht 1.727 m (5' 8\")   Wt 58 kg (127 lb 13.9 oz)   SpO2 99%   BMI 19.44 kg/m       Male in no acute distress.  Alert and answering questions appropriately.  HB 1/6 bilaterally.  Left ear examined under the microscope. Healing well with a little granulation tissue on the edges of the graft.     Audiogram:  Audiogram today was independently reviewed and compared to prior test.    Right mild downsloping to mild/moderate sensorineural hearing loss, left mild downsloping to moderate mixed loss.  Right SRT 20, WRS 96% at 60dB  Left SRT 20, WRS 96% at 70dB   Left hearing slightly worse in the low mid and high frequencies.     Assessment and Plan:  Zachary Mcguire is a 21 year old male who presents for follow up 6 weeks s/p left tympanoplasty with cartilage backing (2021). Some granulation tissue along TM that we will watch as he is still only 5 weeks post-op. Will have him keep using the floxin drops and add in dexamethasone to help with the granulation tissue.     -Continue floxin drops, will add steroid drops x2 weeks   -Continue dry ear precautions  - Return to clinic in one month for repeat evaluation   -OK to use ear plugs at work     Follow-up: 1 month     Conchita Tellez MD   ENT PGY2    Scribe Preparation Attestation:  Melvi GONZALEZ " Chago, a scribe, prepared the chart for today's encounter.      I, Ivanna Miller MD, saw this patient with the resident/fellow and agree with the resident/fellow s findings and plan of care as documented in the resident s/fellow s note. I was present for the entire procedure.    Ivanna Miller MD    The documentation recorded by the scribe accurately reflects the services I personally performed and the decisions made by me.

## 2021-08-23 NOTE — PROGRESS NOTES
"  Otolaryngology Clinic      Name: Zachary Mcguire  MRN: [id number]  Age: 21 year old  : 2021      Chief Complaint:   Follow up    History of Present Illness:   Zachary Mcguire is a 21 year old male who presents for follow up 6 weeks s/p left tympanoplasty with cartilage backing (2021). The patient has not voiced any concerns or complaints since surgery.    Today, the patient has no concerns. Mild aural fullness but otherwise his ear feels good. Never really had pain since surgery. No otorrhea or dizziness.     Physical Exam:   Pulse 61   Temp 98.4  F (36.9  C)   Ht 1.727 m (5' 8\")   Wt 58 kg (127 lb 13.9 oz)   SpO2 99%   BMI 19.44 kg/m       Male in no acute distress.  Alert and answering questions appropriately.  HB 1/6 bilaterally.  Left ear examined under the microscope. Healing well with a little granulation tissue on the edges of the graft.     Audiogram:  Audiogram today was independently reviewed and compared to prior test.    Right mild downsloping to mild/moderate sensorineural hearing loss, left mild downsloping to moderate mixed loss.  Right SRT 20, WRS 96% at 60dB  Left SRT 20, WRS 96% at 70dB   Left hearing slightly worse in the low mid and high frequencies.     Assessment and Plan:  Zachary Mcguire is a 21 year old male who presents for follow up 6 weeks s/p left tympanoplasty with cartilage backing (2021). Some granulation tissue along TM that we will watch as he is still only 5 weeks post-op. Will have him keep using the floxin drops and add in dexamethasone to help with the granulation tissue.     -Continue floxin drops, will add steroid drops x2 weeks   -Continue dry ear precautions  - Return to clinic in one month for repeat evaluation   -OK to use ear plugs at work     Follow-up: 1 month     Conchita Tellez MD   ENT PGY2    Scribe Preparation Attestation:  Melvi GONZALEZ, a scribe, prepared the chart for today's encounter.      I, Ivanna Miller, " MD, saw this patient with the resident/fellow and agree with the resident/fellow s findings and plan of care as documented in the resident s/fellow s note. I was present for the entire procedure.    Ivanna Miller MD    The documentation recorded by the scribe accurately reflects the services I personally performed and the decisions made by me.

## 2021-08-23 NOTE — Clinical Note
"2021       RE: Zachary Mcguire  [address]  [facility] [address]     Dear Colleague,    Thank you for referring your patient, Zachary Mcguire, to the [facility] [place] at [facility]. Please see a copy of my visit note below.      Otolaryngology Clinic      Name: Zachary Mcguire  MRN: [id number]  Age: 21 year old  : 2021      Chief Complaint:   Follow up    History of Present Illness:   Zachary Mcguire is a 21 year old male who presents for follow up 6 weeks s/p left tympanoplasty with cartilage backing (2021). The patient has not voiced any concerns or complaints since surgery.    Today, the patient has no concerns. Mild aural fullness but otherwise his ear feels good. Never really had pain since surgery. No otorrhea or dizziness.     Physical Exam:   Pulse 61   Temp 98.4  F (36.9  C)   Ht 1.727 m (5' 8\")   Wt 58 kg (127 lb 13.9 oz)   SpO2 99%   BMI 19.44 kg/m       Physical examination:  Male in no acute distress.  Alert and answering questions appropriately.  HB 1/6 bilaterally.  Left ears examined under the microscope. Healing well.     Audiogram from today. Independently reviewed persistent CHL compared to preop.       Right SRT 20, WRS 96% at 60dB  Left SRT 20, WRS 96% at 70dB      Assessment and Plan:  Zachary Mcguire is a 21 year old male who presents for follow up 6 weeks s/p left tympanoplasty with cartilage backing (2021). Some granulation tissue along TM that we will watch as he is still only 5 weeks post-op. Will have him keep using the floxin drops and add in dexamethasone to help with the granulation tissue.     -Continue floxin drops, will add steroid drops x2 weeks   -Continue dry ear precautions  - Return to clinic in one month for repeat evaluation   -OK to use ear plugs at work     Follow-up: 1 month     Conchita Tellez MD   ENT PGY2    Scribe Preparation " Attestation:  I, Melvi Anders, a scribe, prepared the chart for today's encounter.          Again, thank you for allowing me to participate in the care of your patient.      Sincerely,    Ivanna Miller MD

## (undated) DEVICE — SYR 10ML LL W/O NDL

## (undated) DEVICE — SOL NACL 0.9% IRRIG 500ML BOTTLE 2F7123

## (undated) DEVICE — BLADE KNIFE BEAVER MINI BEAVER6400

## (undated) DEVICE — PREP SKIN SCRUB TRAY 4461A

## (undated) DEVICE — DRSG COTTON BALL 6PK LCB62

## (undated) DEVICE — GLOVE PROTEXIS POWDER FREE SMT 6.5  2D72PT65X

## (undated) DEVICE — DRAPE MICROSCOPE LEICA 54X150" AR8033650

## (undated) DEVICE — SUCTION MANIFOLD NEPTUNE 2 SYS 4 PORT 0702-020-000

## (undated) DEVICE — DRSG BANDAID 1X3" FABRIC CURITY LATEX FREE KC44101

## (undated) DEVICE — BLADE KNIFE BEAVER TYMPANOPLASTY 2.5MM W/60D DOWN 377200

## (undated) DEVICE — PREP POVIDONE IODINE SOLUTION 10% 4OZ BOTTLE 29906-004

## (undated) DEVICE — PACK EAR CUSTOM ASC

## (undated) DEVICE — LINEN TOWEL PACK X5 5464

## (undated) DEVICE — SPONGE SURGIFOAM 100 1974

## (undated) DEVICE — ESU ELEC BLADE 2.75" COATED/INSULATED E1455

## (undated) DEVICE — DRAPE SPLIT SHEET 77X108 REINFORCED 29436

## (undated) DEVICE — PREP POVIDONE-IODINE 7.5% SCRUB 4OZ BOTTLE MDS093945

## (undated) DEVICE — SOL WATER IRRIG 500ML BOTTLE 2F7113

## (undated) DEVICE — NDL ANGIOCATH 14GA 1.25" 4048

## (undated) DEVICE — WIPE INSTRUMENT MEROCEL 400200

## (undated) DEVICE — NIM ELEC SUBDERMAL NDL 3PAIR/BOX

## (undated) DEVICE — ESU GROUND PAD ADULT W/CORD E7507

## (undated) RX ORDER — LIDOCAINE HYDROCHLORIDE 20 MG/ML
INJECTION, SOLUTION EPIDURAL; INFILTRATION; INTRACAUDAL; PERINEURAL
Status: DISPENSED
Start: 2021-07-12

## (undated) RX ORDER — CLINDAMYCIN PHOSPHATE 900 MG/50ML
INJECTION, SOLUTION INTRAVENOUS
Status: DISPENSED
Start: 2021-07-12

## (undated) RX ORDER — DEXAMETHASONE SODIUM PHOSPHATE 10 MG/ML
INJECTION, SOLUTION INTRAMUSCULAR; INTRAVENOUS
Status: DISPENSED
Start: 2021-07-12

## (undated) RX ORDER — LIDOCAINE HYDROCHLORIDE AND EPINEPHRINE 10; 10 MG/ML; UG/ML
INJECTION, SOLUTION INFILTRATION; PERINEURAL
Status: DISPENSED
Start: 2021-07-12

## (undated) RX ORDER — EPINEPHRINE NASAL SOLUTION 1 MG/ML
SOLUTION NASAL
Status: DISPENSED
Start: 2021-07-12

## (undated) RX ORDER — GLYCOPYRROLATE 0.2 MG/ML
INJECTION INTRAMUSCULAR; INTRAVENOUS
Status: DISPENSED
Start: 2021-07-12

## (undated) RX ORDER — BACITRACIN 500 [USP'U]/G
OINTMENT OPHTHALMIC
Status: DISPENSED
Start: 2021-07-12

## (undated) RX ORDER — ACETAMINOPHEN 325 MG/1
TABLET ORAL
Status: DISPENSED
Start: 2021-07-12

## (undated) RX ORDER — GABAPENTIN 300 MG/1
CAPSULE ORAL
Status: DISPENSED
Start: 2021-07-12

## (undated) RX ORDER — ONDANSETRON 2 MG/ML
INJECTION INTRAMUSCULAR; INTRAVENOUS
Status: DISPENSED
Start: 2021-07-12

## (undated) RX ORDER — PROPOFOL 10 MG/ML
INJECTION, EMULSION INTRAVENOUS
Status: DISPENSED
Start: 2021-07-12